# Patient Record
Sex: FEMALE | Race: BLACK OR AFRICAN AMERICAN | NOT HISPANIC OR LATINO | Employment: STUDENT
[De-identification: names, ages, dates, MRNs, and addresses within clinical notes are randomized per-mention and may not be internally consistent; named-entity substitution may affect disease eponyms.]

---
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
## 2023-07-14 ENCOUNTER — HOSPITAL ENCOUNTER (EMERGENCY)
Facility: CLINIC | Age: 23
Discharge: HOME | End: 2023-07-15
Attending: EMERGENCY MEDICINE | Admitting: EMERGENCY MEDICINE
Payer: COMMERCIAL

## 2023-07-14 VITALS
TEMPERATURE: 97.6 F | DIASTOLIC BLOOD PRESSURE: 80 MMHG | HEART RATE: 94 BPM | OXYGEN SATURATION: 99 % | SYSTOLIC BLOOD PRESSURE: 130 MMHG | RESPIRATION RATE: 18 BRPM

## 2023-07-14 DIAGNOSIS — R21 RASH: ICD-10-CM

## 2023-07-14 DIAGNOSIS — J02.9 ACUTE PHARYNGITIS, UNSPECIFIED ETIOLOGY: ICD-10-CM

## 2023-07-14 LAB — GROUP A STREP BY PCR: NOT DETECTED

## 2023-07-14 PROCEDURE — 250N000013 HC RX MED GY IP 250 OP 250 PS 637: Performed by: EMERGENCY MEDICINE

## 2023-07-14 PROCEDURE — 87651 STREP A DNA AMP PROBE: CPT | Performed by: EMERGENCY MEDICINE

## 2023-07-14 PROCEDURE — 87637 SARSCOV2&INF A&B&RSV AMP PRB: CPT | Performed by: EMERGENCY MEDICINE

## 2023-07-14 PROCEDURE — 99283 EMERGENCY DEPT VISIT LOW MDM: CPT | Performed by: EMERGENCY MEDICINE

## 2023-07-14 RX ORDER — HYDROXYZINE HYDROCHLORIDE 25 MG/1
25 TABLET, FILM COATED ORAL ONCE
Status: COMPLETED | OUTPATIENT
Start: 2023-07-14 | End: 2023-07-14

## 2023-07-14 RX ORDER — ACETAMINOPHEN 500 MG
1000 TABLET ORAL ONCE
Status: COMPLETED | OUTPATIENT
Start: 2023-07-14 | End: 2023-07-14

## 2023-07-14 RX ADMIN — HYDROXYZINE HYDROCHLORIDE 25 MG: 25 TABLET ORAL at 23:11

## 2023-07-14 ASSESSMENT — ACTIVITIES OF DAILY LIVING (ADL): ADLS_ACUITY_SCORE: 33

## 2023-07-15 LAB
FLUAV RNA SPEC QL NAA+PROBE: NEGATIVE
FLUBV RNA RESP QL NAA+PROBE: NEGATIVE
RSV RNA SPEC NAA+PROBE: NEGATIVE
SARS-COV-2 RNA RESP QL NAA+PROBE: NEGATIVE

## 2023-07-15 NOTE — DISCHARGE INSTRUCTIONS
Take Benadryl as needed for the itching and rash.  Take ibuprofen and Tylenol as needed for sore throat.  Please return to the emergency department if you have any facial swelling, tongue swelling, lip swelling, any difficulty breathing, any worsening rash, fever, vomiting, any other concerns.    Please make an appointment to follow up with Your Primary Care Provider in 3-4 days if not improving.

## 2023-07-15 NOTE — ED TRIAGE NOTES
Patient arrives ambulatory with a sore throat and hives. The hives started three days ago after eating gummi bears. Sore throat started today. Denies pain. But has difficulty with swallowing. A&Ox4     Triage Assessment     Row Name 07/14/23 1177       Triage Assessment (Adult)    Airway WDL WDL       Respiratory WDL    Respiratory WDL WDL       Skin Circulation/Temperature WDL    Skin Circulation/Temperature WDL WDL       Cardiac WDL    Cardiac WDL WDL       Peripheral/Neurovascular WDL    Peripheral Neurovascular WDL WDL       Cognitive/Neuro/Behavioral WDL    Cognitive/Neuro/Behavioral WDL WDL

## 2023-07-15 NOTE — ED PROVIDER NOTES
ED Provider Note  [facility]      History     Chief Complaint   Patient presents with     Pharyngitis     Hives     HPI  Yulia Lawrence is a 23 year old female who presents with generalized erythematous and itchy rash for the last 3 days.  Started after she ate some edible gummy bears with cannabis.  The rash is on all 4 extremities, not on her face.  No throat swelling, lip swelling, tongue swelling.  Has not had any blistering or excoriations.  Denies fever.  No nausea, vomiting, abdominal pain, chest pain or shortness of breath.  No rash on the palms of the hands.  She has noted some slight erythema over the plantar aspect of the MTPs of the first metatarsals bilaterally.  Patient also developed a sore throat today.  Has pain with swallowing.  No cough, runny nose.  She has a 1-year-old at home.  He does not go to .  She does not work outside the home, does not go out into the woods, thinks it is very unlikely that she would have been exposed to a tick.    Past Medical History  Past Medical History:   Diagnosis Date     Depressive disorder      No past surgical history on file.  escitalopram (LEXAPRO) 10 MG tablet  hydrOXYzine (ATARAX) 25 MG tablet      Allergies   Allergen Reactions     No Clinical Screening - See Comments Rash     Mom reports rash from medication, unsure of what it was     Family History  No family history on file.  Social History   Social History     Tobacco Use     Smoking status: Never     Smokeless tobacco: Never   Substance Use Topics     Alcohol use: No     Drug use: No      Past medical history, past surgical history, medications, allergies, family history, and social history were reviewed with the patient. No additional pertinent items.      A medically appropriate review of systems was performed with pertinent positives and negatives noted in the HPI, and all other systems negative.    Physical Exam   BP: 130/80  Pulse: 94  Temp: 97.6  F (36.4   C)  Resp: 18  SpO2: 99 %  Physical Exam  GEN:  Alert, well developed, no acute distress  HEENT:  PERRL, EOMI, Mucous membranes are moist.  No facial swelling, lip or tongue swelling.  There is posterior pharyngeal erythema without exudate or swelling, uvula is midline  Lymph: There are tender palpable lymph nodes in the submandibular area  Cardio:  RRR, no murmur, radial pulses equal bilaterally  PULM:  Lungs clear, good air movement, no wheezes, rales   Abd:  Soft, normal bowel sounds, no focal tenderness  Back exam:  No CVA tenderness  Musculoskeletal:  normal range of motion, no lower extremity swelling or calf tenderness  Neuro:  Alert and oriented X3, Follows commands, moving all extremities spontaneously   Skin:  Warm, dry, diffuse urticarial blanching rash on all 4 extremities, none on the palms of the hands, some erythema over the plantar MTPs of the first metatarsals.  No vesicles or blistering, no excoriations    ED Course, Procedures, & Data      Procedures    Patient was given hydroxyzine for the itching.  Swabs were done for strep, influenza, COVID, RSV         Results for orders placed or performed during the hospital encounter of 07/14/23   Symptomatic Influenza A/B, RSV, & SARS-CoV2 PCR (COVID-19) Nasopharyngeal     Status: Normal    Specimen: Nasopharyngeal; Swab   Result Value Ref Range    Influenza A PCR Negative Negative    Influenza B PCR Negative Negative    RSV PCR Negative Negative    SARS CoV2 PCR Negative Negative    Narrative    Testing was performed using the Xpert Xpress CoV2/Flu/RSV Assay on the [employer] Gene[employer]pert Instrument. This test should be ordered for the detection of SARS-CoV-2, influenza, and RSV viruses in individuals who meet clinical and/or epidemiological criteria. Test performance is unknown in asymptomatic patients. This test is for in vitro diagnostic use under the FDA EUA for laboratories certified under CLIA to perform high or moderate complexity testing. This test has  not been FDA cleared or approved. A negative result does not rule out the presence of PCR inhibitors in the specimen or target RNA in concentration below the limit of detection for the assay. If only one viral target is positive but coinfection with multiple targets is suspected, the sample should be re-tested with another FDA cleared, approved, or authorized test, if coinfection would change clinical management. This test was validated by the [facility]. These laboratories are certified under the Clinical Laboratory Improvement Amendments of 1988 (CLIA-88) as qualified to perform high complexity laboratory testing.   Group A Streptococcus PCR Throat Swab     Status: Normal    Specimen: Throat; Swab   Result Value Ref Range    Group A strep by PCR Not Detected Not Detected    Narrative    The Xpert Xpress Strep A test, performed on the [employer], is a rapid, qualitative in vitro diagnostic test for the detection of Streptococcus pyogenes (Group A ß-hemolytic Streptococcus, Strep A) in throat swab specimens from patients with signs and symptoms of pharyngitis. The Xpert Xpress Strep A test can be used as an aid in the diagnosis of Group A Streptococcal pharyngitis. The assay is not intended to monitor treatment for Group A Streptococcus infections. The Xpert Xpress Strep A test utilizes an automated real-time polymerase chain reaction (PCR) to detect Streptococcus pyogenes DNA.     Medications   hydrOXYzine (ATARAX) tablet 25 mg (25 mg Oral $Given 7/14/23 2311)   acetaminophen (TYLENOL) tablet 1,000 mg (1,000 mg Oral Not Given 7/14/23 2313)     Labs Ordered and Resulted from Time of ED Arrival to Time of ED Departure   INFLUENZA A/B, RSV, & SARS-COV2 PCR - Normal       Result Value    Influenza A PCR Negative      Influenza B PCR Negative      RSV PCR Negative      SARS CoV2 PCR Negative     GROUP A STREPTOCOCCUS PCR THROAT SWAB - Normal    Group A strep by PCR Not Detected        No orders to display          Critical care was not performed.     Medical Decision Making  The patient's presentation was of low complexity (an acute and uncomplicated illness or injury).    The patient's evaluation involved:  ordering and/or review of 3+ test(s) in this encounter (see separate area of note for details)    The patient's management necessitated moderate risk (prescription drug management including medications given in the ED).      Assessment & Plan    Patient presents with urticarial type rash shortly after using marijuana Gummies.  It is unclear if this is allergic reaction or perhaps she simply has a viral exanthem.  There is no sign of anaphylaxis, no facial involvement.  She does have a sore throat, no cough or runny nose, negative strep test.  I advised symptomatic care, Benadryl, Tylenol, ibuprofen, drink plenty fluids and return if she has any worsening of the rash, any swelling of the tongue, lips, face, any throat swelling, difficulty breathing, fever, any other concerns.    I have reviewed the nursing notes. I have reviewed the findings, diagnosis, plan and need for follow up with the patient.    Discharge Medication List as of 7/15/2023 12:29 AM          Final diagnoses:   Acute pharyngitis, unspecified etiology   Rash       Agustina Warren  [facility] EMERGENCY DEPARTMENT  7/14/2023     Agustina Warren MD  07/15/23 0033